# Patient Record
Sex: FEMALE | Race: WHITE | NOT HISPANIC OR LATINO | Employment: UNEMPLOYED | URBAN - METROPOLITAN AREA
[De-identification: names, ages, dates, MRNs, and addresses within clinical notes are randomized per-mention and may not be internally consistent; named-entity substitution may affect disease eponyms.]

---

## 2019-11-20 ENCOUNTER — HOSPITAL ENCOUNTER (EMERGENCY)
Facility: HOSPITAL | Age: 24
Discharge: HOME | End: 2019-11-20
Attending: EMERGENCY MEDICINE
Payer: MEDICAID

## 2019-11-20 VITALS
SYSTOLIC BLOOD PRESSURE: 115 MMHG | WEIGHT: 175 LBS | BODY MASS INDEX: 31.01 KG/M2 | TEMPERATURE: 99 F | HEART RATE: 89 BPM | RESPIRATION RATE: 18 BRPM | DIASTOLIC BLOOD PRESSURE: 70 MMHG | OXYGEN SATURATION: 98 % | HEIGHT: 63 IN

## 2019-11-20 DIAGNOSIS — J10.1 INFLUENZA B: Primary | ICD-10-CM

## 2019-11-20 LAB
DEPRECATED S PYO AG THROAT QL EIA: NEGATIVE
INFLUENZA A, MOLECULAR: NEGATIVE
INFLUENZA B, MOLECULAR: POSITIVE
SPECIMEN SOURCE: ABNORMAL

## 2019-11-20 PROCEDURE — 25000003 PHARM REV CODE 250: Mod: ER | Performed by: PHYSICIAN ASSISTANT

## 2019-11-20 PROCEDURE — 87081 CULTURE SCREEN ONLY: CPT | Mod: ER

## 2019-11-20 PROCEDURE — 87502 INFLUENZA DNA AMP PROBE: CPT | Mod: ER

## 2019-11-20 PROCEDURE — 99284 EMERGENCY DEPT VISIT MOD MDM: CPT | Mod: 25,ER

## 2019-11-20 PROCEDURE — 63600175 PHARM REV CODE 636 W HCPCS: Mod: ER | Performed by: PHYSICIAN ASSISTANT

## 2019-11-20 PROCEDURE — 87880 STREP A ASSAY W/OPTIC: CPT | Mod: ER

## 2019-11-20 PROCEDURE — 96372 THER/PROPH/DIAG INJ SC/IM: CPT | Mod: ER

## 2019-11-20 RX ORDER — ACETAMINOPHEN 500 MG
1000 TABLET ORAL
Status: COMPLETED | OUTPATIENT
Start: 2019-11-20 | End: 2019-11-20

## 2019-11-20 RX ORDER — TRIPROLIDINE/PSEUDOEPHEDRINE 2.5MG-60MG
600 TABLET ORAL
Status: COMPLETED | OUTPATIENT
Start: 2019-11-20 | End: 2019-11-20

## 2019-11-20 RX ORDER — DEXAMETHASONE SODIUM PHOSPHATE 4 MG/ML
8 INJECTION, SOLUTION INTRA-ARTICULAR; INTRALESIONAL; INTRAMUSCULAR; INTRAVENOUS; SOFT TISSUE
Status: COMPLETED | OUTPATIENT
Start: 2019-11-20 | End: 2019-11-20

## 2019-11-20 RX ADMIN — DEXAMETHASONE SODIUM PHOSPHATE 8 MG: 4 INJECTION, SOLUTION INTRA-ARTICULAR; INTRALESIONAL; INTRAMUSCULAR; INTRAVENOUS; SOFT TISSUE at 07:11

## 2019-11-20 RX ADMIN — ACETAMINOPHEN 1000 MG: 500 TABLET ORAL at 06:11

## 2019-11-20 RX ADMIN — IBUPROFEN 600 MG: 100 SUSPENSION ORAL at 07:11

## 2019-11-21 NOTE — DISCHARGE INSTRUCTIONS
You tested positive for influenza B.  You are instructed to follow up with her primary care provider for re-evaluation and to return to the emergency department immediately for any new or worsening symptoms

## 2019-11-21 NOTE — ED PROVIDER NOTES
"Encounter Date: 11/20/2019       History     Chief Complaint   Patient presents with    Fever     pt reports "I've been sick with sinus stuff for like 7 days and I had a fever and it broke over the weekend but now it's back." +Congestion, cough, and sinus pressure. Pt reports taking dayquil and tylenol this morning. Pt is a  and has been exposed to multiple children    Nasal Congestion     24-year-old female presents to the emergency department for evaluation of five day history of nasal congestion, sore throat, fever, sinus pressure and mild nonproductive cough.  She reports that the symptoms began gradually approximately 5 days ago and have been constant since onset.  She reports that the cough is a mild cough that is nonproductive.  She denies any chest pain or shortness of breath.  She reports nasal congestion, sinus pressure and runny nose with yellow nasal discharge. She reports that she has had intermittent fever for which she has been taking DayQuil, Tylenol or Motrin.  She reports her last dose of Tylenol was approximately 9:00 a.m. this morning and last dose of Motrin was yesterday.  She reports her last menstrual period was 11/09/2019.  She denies risk of pregnancy.  She does report that she is a  and multiple kids in her class have been sick.        Review of patient's allergies indicates:   Allergen Reactions    Sulfa (sulfonamide antibiotics)      History reviewed. No pertinent past medical history.  History reviewed. No pertinent surgical history.  History reviewed. No pertinent family history.  Social History     Tobacco Use    Smoking status: Current Every Day Smoker     Types: Cigarettes   Substance Use Topics    Alcohol use: Not on file    Drug use: Not on file     Review of Systems   Constitutional: Positive for fever. Negative for activity change and appetite change.   HENT: Positive for congestion, rhinorrhea and sore throat. Negative for ear discharge, " ear pain, trouble swallowing and voice change.    Eyes: Negative for photophobia and visual disturbance.   Respiratory: Positive for cough. Negative for shortness of breath.    Cardiovascular: Negative for chest pain.   Gastrointestinal: Negative for abdominal pain, constipation, diarrhea, nausea and vomiting.   Genitourinary: Negative for decreased urine volume.   Musculoskeletal: Negative for back pain, joint swelling and neck pain.   Skin: Negative for rash.   Neurological: Negative for dizziness, syncope, weakness, light-headedness, numbness and headaches.       Physical Exam     Initial Vitals [11/20/19 1828]   BP Pulse Resp Temp SpO2   125/82 108 17 (!) 101.1 °F (38.4 °C) 97 %      MAP       --         Physical Exam    Nursing note and vitals reviewed.  Constitutional: She appears well-developed and well-nourished. She is not diaphoretic. No distress.   HENT:   Head: Normocephalic and atraumatic.   Right Ear: External ear normal.   Left Ear: External ear normal.   Nose: Nose normal.   Mouth/Throat: Oropharynx is clear and moist.   Eyes: Conjunctivae and EOM are normal. Pupils are equal, round, and reactive to light.   Neck: Normal range of motion. Neck supple.   Cardiovascular: Normal rate, regular rhythm and normal heart sounds.   Pulmonary/Chest: Breath sounds normal. No respiratory distress. She has no wheezes. She has no rhonchi. She has no rales. She exhibits no tenderness.   Abdominal: Soft. There is no tenderness.   Lymphadenopathy:     She has no cervical adenopathy.   Neurological: She is alert and oriented to person, place, and time.   Skin: Skin is warm and dry.   Psychiatric: She has a normal mood and affect.         ED Course   Procedures  Labs Reviewed   INFLUENZA A & B BY MOLECULAR - Abnormal; Notable for the following components:       Result Value    Influenza B, Molecular Positive (*)     All other components within normal limits   THROAT SCREEN, RAPID   CULTURE, STREP A,  THROAT           Imaging Results    None          Medical Decision Making:   Initial Assessment:   24-year-old female presents to the emergency department for evaluation of cough, nasal congestion, fever, sinus pressure and pharyngitis.  Physical exam reveals a nontoxic-appearing female in no acute distress. Febrile, but other vital signs within normal limits.  Neurological exam reveals an alert oriented patient.  Patient appears well hydrated as her mucous membranes are moist.  TMs reveal no erythema.  Posterior pharynx reveals no erythema, edema or tonsillar exudate. Lungs clear to auscultation bilaterally. No respiratory distress or accessory muscle use noted.  Differential Diagnosis:   Streptococcal pharyngitis  Viral URI  Influenza  ED Management:  Rapid strep negative.  Influenza B positive. Patient is outside of the timeframe for Tamiflu.  Instructed patient to follow up with her primary care provider for re-evaluation and to return to the emergency department immediately for any new or worsening symptoms.                                 Clinical Impression:       ICD-10-CM ICD-9-CM   1. Influenza B J10.1 487.1                             Jayleen Garcia PA-C  11/20/19 1935

## 2019-11-23 LAB — BACTERIA THROAT CULT: NORMAL
